# Patient Record
Sex: FEMALE | Race: WHITE | NOT HISPANIC OR LATINO
[De-identification: names, ages, dates, MRNs, and addresses within clinical notes are randomized per-mention and may not be internally consistent; named-entity substitution may affect disease eponyms.]

---

## 2018-04-19 ENCOUNTER — APPOINTMENT (OUTPATIENT)
Dept: CARDIOLOGY | Facility: CLINIC | Age: 58
End: 2018-04-19

## 2018-09-04 ENCOUNTER — APPOINTMENT (OUTPATIENT)
Dept: CARDIOLOGY | Facility: CLINIC | Age: 58
End: 2018-09-04
Payer: COMMERCIAL

## 2018-09-04 VITALS
HEART RATE: 93 BPM | WEIGHT: 176 LBS | RESPIRATION RATE: 18 BRPM | BODY MASS INDEX: 29.32 KG/M2 | HEIGHT: 65 IN | SYSTOLIC BLOOD PRESSURE: 100 MMHG | DIASTOLIC BLOOD PRESSURE: 65 MMHG

## 2018-09-04 DIAGNOSIS — Z00.00 ENCOUNTER FOR GENERAL ADULT MEDICAL EXAMINATION W/OUT ABNORMAL FINDINGS: ICD-10-CM

## 2018-09-04 DIAGNOSIS — Z78.9 OTHER SPECIFIED HEALTH STATUS: ICD-10-CM

## 2018-09-04 DIAGNOSIS — Z82.3 FAMILY HISTORY OF STROKE: ICD-10-CM

## 2018-09-04 DIAGNOSIS — Z86.010 PERSONAL HISTORY OF COLONIC POLYPS: ICD-10-CM

## 2018-09-04 PROCEDURE — 99244 OFF/OP CNSLTJ NEW/EST MOD 40: CPT

## 2018-09-04 PROCEDURE — 93000 ELECTROCARDIOGRAM COMPLETE: CPT

## 2018-09-28 ENCOUNTER — APPOINTMENT (OUTPATIENT)
Dept: CARDIOLOGY | Facility: CLINIC | Age: 58
End: 2018-09-28
Payer: COMMERCIAL

## 2018-09-28 PROCEDURE — 93015 CV STRESS TEST SUPVJ I&R: CPT

## 2018-10-15 ENCOUNTER — APPOINTMENT (OUTPATIENT)
Dept: CARDIOLOGY | Facility: CLINIC | Age: 58
End: 2018-10-15
Payer: COMMERCIAL

## 2018-10-15 PROCEDURE — 93306 TTE W/DOPPLER COMPLETE: CPT

## 2018-10-17 ENCOUNTER — APPOINTMENT (OUTPATIENT)
Dept: ULTRASOUND IMAGING | Facility: CLINIC | Age: 58
End: 2018-10-17
Payer: COMMERCIAL

## 2018-10-17 ENCOUNTER — OUTPATIENT (OUTPATIENT)
Dept: OUTPATIENT SERVICES | Facility: HOSPITAL | Age: 58
LOS: 1 days | End: 2018-10-17
Payer: COMMERCIAL

## 2018-10-17 DIAGNOSIS — I10 ESSENTIAL (PRIMARY) HYPERTENSION: ICD-10-CM

## 2018-10-17 DIAGNOSIS — E11.9 TYPE 2 DIABETES MELLITUS WITHOUT COMPLICATIONS: ICD-10-CM

## 2018-10-17 DIAGNOSIS — E78.5 HYPERLIPIDEMIA, UNSPECIFIED: ICD-10-CM

## 2018-10-17 PROCEDURE — 93880 EXTRACRANIAL BILAT STUDY: CPT | Mod: 26

## 2018-10-17 PROCEDURE — 93880 EXTRACRANIAL BILAT STUDY: CPT

## 2018-10-23 RX ORDER — OMEPRAZOLE 20 MG/1
20 TABLET, DELAYED RELEASE ORAL
Refills: 0 | Status: ACTIVE | COMMUNITY

## 2018-10-29 ENCOUNTER — APPOINTMENT (OUTPATIENT)
Dept: CARDIOLOGY | Facility: CLINIC | Age: 58
End: 2018-10-29
Payer: COMMERCIAL

## 2018-10-29 VITALS — SYSTOLIC BLOOD PRESSURE: 116 MMHG | DIASTOLIC BLOOD PRESSURE: 70 MMHG

## 2018-10-29 PROCEDURE — 99214 OFFICE O/P EST MOD 30 MIN: CPT

## 2018-10-29 PROCEDURE — 93000 ELECTROCARDIOGRAM COMPLETE: CPT

## 2019-04-29 ENCOUNTER — APPOINTMENT (OUTPATIENT)
Dept: CARDIOLOGY | Facility: CLINIC | Age: 59
End: 2019-04-29
Payer: COMMERCIAL

## 2019-04-29 ENCOUNTER — NON-APPOINTMENT (OUTPATIENT)
Age: 59
End: 2019-04-29

## 2019-04-29 VITALS
WEIGHT: 183 LBS | OXYGEN SATURATION: 98 % | HEART RATE: 116 BPM | HEIGHT: 65 IN | RESPIRATION RATE: 15 BRPM | BODY MASS INDEX: 30.49 KG/M2 | DIASTOLIC BLOOD PRESSURE: 50 MMHG | SYSTOLIC BLOOD PRESSURE: 108 MMHG

## 2019-04-29 PROCEDURE — 93000 ELECTROCARDIOGRAM COMPLETE: CPT

## 2019-04-29 PROCEDURE — 99214 OFFICE O/P EST MOD 30 MIN: CPT

## 2019-04-29 RX ORDER — FENOFIBRATE 150 MG/1
150 CAPSULE ORAL DAILY
Refills: 0 | Status: DISCONTINUED | COMMUNITY
End: 2019-04-29

## 2019-04-29 NOTE — ASSESSMENT
[FreeTextEntry1] : ECG sinus tach at 111 beats per minute no significant ST or T wave changes\par \par Laboratory data 4/18/19:\par Cholesterol 247\par HDL 45\par \par Triglycerides 231\par A1c 7.5\par \par  Echocardiogram 10/15/18:\par Normal LV size and function\par Mild aortic valve sclerosis\par No documented pulmonary hypertension.\par \par Carotid Doppler study 10/17/18:\par Mild plaquing of the carotids bilaterally. No hemodynamically significant stenosis.\par \par Exercise stress test 9/28/18:\par Exercise 8 minutes 30 seconds. (9 Mets).\par peak  (110% maximum).\par Blood pressure response normal\par ECG negative for arrhythmia or ischemia.\par Treadmill score 9 consistent with low risk.\par \par No laboratory data currently available.  \par \par Impression:  58-year-old female with early surgical menopause, diabetes mellitus, hyperlipidemia, a history of hypertension who now presents for clinic reevaluation of her potential for underlying cardiac disease.  \par She does experience some degree of exertional fatigue.  \par Stress testing shows reasonable reduced tolerance albeit with an accelerated heart rate response consistent with deconditioning. There is no evidence of ischemia.\par \par There is also the problem with being statin intolerant and has gone through at least three statins with musculoskeletal discomfort limiting its usage.\par The current lipid status in view of her known history of atherosclerotic disease of the carotids suggest that we have to try again. We'll start rosuvastatin 5 mg p.o. q.o.d. in conjunction with coincide Q10 200 mg a day. Hopefully she will tolerate this and will repeat blood work in 3 months.\par  A carotid Doppler does show some early plaque formation suggesting that her risk factors are actively contributing to the formation of atherosclerosis.\par \par She is also a bit concerned about her blood pressure being periodically low and she believes that she is occasionally symptomatic of this.  \par Does not appear to have any significant structural heart disease on echocardiography.\par \par Recommendations:\par 1.  Instructed the patient about the benefits of a diet that restricts portion sizes, increases frequency of meals and consists of  vegetables, (more green and leafy),fruit and nuts, whole grains, lean proteins and limits carbohydrates and meat and dairy fats\par \par 2.The patient was instructed to follow a symptom limited regimen of moderate aerobic exercise for 30 minutes 3 to 4 days a week. A warm up and cool down period are to be added to the regimen and small incremental changes can be made every few weeks. Any new symptoms of exercise related chest pain or dyspnea should be reported.\par \par 3. Obtain  blood work in 3 months\par \par 4. Consideration to weaning off antihypertensive meds over the course of the next several months\par  I think she needs to monitor her blood pressure carefully and establish whether or not there is adequate control and whether or not amlodipine could and should be stopped. ( she was told this last visit)\par \par We discussed the role of a more aggressive exercise regimen and her nutritional plan.    \par  \par

## 2019-04-29 NOTE — REASON FOR VISIT
[FreeTextEntry1] : This is a 58-year-old female who presents here for cardiac re-evaluation with concerns about her underlying risk for cardiac disease.  \par \par She does have a history that includes:\par 1.  Diabetes.\par 2.  Hyperlipidemia. \par 3.  An equivocal family history of premature coronary disease.  \par 4. HTN\par \par Her blood pressure has been controlled since she began taking losartan and amlodipine. \par She is not regularly exercising and dietary measures have been inconsistent\par She denies any actual chest pain, palpitations, PND, orthopnea, or edema.  No syncope or near syncope. She denies hypertension or tobacco use.   \par Complains of periodic lightheadedness and heart racing.\par Other medical history is as detailed below.\par \par Patient did have surgical menopause 8 years ago.  \par

## 2019-08-12 ENCOUNTER — APPOINTMENT (OUTPATIENT)
Dept: CARDIOLOGY | Facility: CLINIC | Age: 59
End: 2019-08-12

## 2020-04-23 RX ORDER — DOCUSATE SODIUM 100 MG/1
CAPSULE ORAL
Refills: 0 | Status: DISCONTINUED | COMMUNITY
End: 2020-04-23

## 2020-04-23 RX ORDER — NAPROXEN SODIUM 220 MG
TABLET ORAL
Refills: 0 | Status: DISCONTINUED | COMMUNITY
End: 2020-04-23

## 2020-04-23 RX ORDER — PSYLLIUM HUSK 0.4 G
CAPSULE ORAL
Refills: 0 | Status: DISCONTINUED | COMMUNITY
End: 2020-04-23

## 2020-04-27 ENCOUNTER — APPOINTMENT (OUTPATIENT)
Dept: CARDIOLOGY | Facility: CLINIC | Age: 60
End: 2020-04-27
Payer: COMMERCIAL

## 2020-04-27 PROCEDURE — 99214 OFFICE O/P EST MOD 30 MIN: CPT | Mod: 95

## 2020-04-27 RX ORDER — LOSARTAN POTASSIUM 100 MG/1
100 TABLET, FILM COATED ORAL DAILY
Qty: 90 | Refills: 3 | Status: ACTIVE | COMMUNITY
Start: 1900-01-01 | End: 1900-01-01

## 2020-04-27 RX ORDER — ROSUVASTATIN CALCIUM 5 MG/1
5 TABLET, FILM COATED ORAL
Qty: 90 | Refills: 3 | Status: ACTIVE | COMMUNITY
Start: 2019-04-29 | End: 1900-01-01

## 2020-04-27 RX ORDER — UBIDECARENONE/VIT E ACET 100MG-5
100 CAPSULE ORAL
Refills: 0 | Status: ACTIVE | COMMUNITY

## 2020-04-27 NOTE — PHYSICAL EXAM
[General Appearance - Well Developed] : well developed [Normal Appearance] : normal appearance [General Appearance - Well Nourished] : well nourished [Normal Conjunctiva] : the conjunctiva exhibited no abnormalities [Respiration, Rhythm And Depth] : normal respiratory rhythm and effort [Normal Jugular Venous V Waves Present] : normal jugular venous V waves present [Exaggerated Use Of Accessory Muscles For Inspiration] : no accessory muscle use [FreeTextEntry1] : No edema [Skin Color & Pigmentation] : normal skin color and pigmentation [Skin Turgor] : normal skin turgor [] : no rash [Impaired Insight] : insight and judgment were intact [Oriented To Time, Place, And Person] : oriented to person, place, and time [No Anxiety] : not feeling anxious

## 2020-04-27 NOTE — HISTORY OF PRESENT ILLNESS
[Home] : at home, [unfilled] , at the time of the visit. [Medical Office: ([facility])___] : at the medical office located in  [Patient] : the patient [Self] : self [FreeTextEntry1] : Patient denies any other significant complaints.\par She is isolating in her children's house and has kept free of any contact with people sick with Carona\par \par No cough, shortness of breath or fever.

## 2020-04-27 NOTE — REASON FOR VISIT
[FreeTextEntry1] : This is a 58-year-old female who presents  for cardiac re-evaluation with concerns about her underlying risk for cardiac disease.  \par \par She does have a history that includes:\par 1.  Diabetes.\par 2.  Hyperlipidemia. \par 3.  An equivocal family history of premature coronary disease.  \par 4. HTN\par \par Her blood pressure has been controlled since she began taking losartan and amlodipine.   Avg = 120/70 mm hg\par She is walking regularly exercising, but dietary measures have been inconsistent\par She denies any actual chest pain, palpitations, PND, orthopnea, or edema.  No syncope or near syncope. She denies tobacco use.   \par Complains of periodic lightheadedness and heart racing.\par Other medical history is as detailed below.\par \par Patient did have surgical menopause 8 years ago.  \par

## 2020-04-27 NOTE — ASSESSMENT
[FreeTextEntry1] : \par \par Lab Data \par \par      2020\par Chol:     147         247\par HDL:      43             45\par LDL:       75          156\par Tr          231\par A1C =   9.7           7.5\par \par Laboratory data 19:\par Cholesterol 247\par HDL 45\par \par Triglycerides 231\par A1c 7.5\par \par  Echocardiogram 10/15/18:\par Normal LV size and function\par Mild aortic valve sclerosis\par No documented pulmonary hypertension.\par \par Carotid Doppler study 10/17/18:\par Mild plaquing of the carotids bilaterally. No hemodynamically significant stenosis.\par \par Exercise stress test 18:\par Exercise 8 minutes 30 seconds. (9 Mets).\par peak  (110% maximum).\par Blood pressure response normal\par ECG negative for arrhythmia or ischemia.\par Treadmill score 9 consistent with low risk.\par \par No laboratory data currently available.  \par \par Impression:  58-year-old female with early surgical menopause, diabetes mellitus, hyperlipidemia, a history of hypertension who now presents for clinic reevaluation of her potential for underlying cardiac disease.  \par She does experience some degree of exertional fatigue.  \par Stress testing shows reasonable reduced tolerance albeit with an accelerated heart rate response consistent with deconditioning. There is no evidence of ischemia.\par \par She has been statin intolerant to Atorvastatin and simvastatin with musculoskeletal discomfort limiting its usage, but now tolerating low dose rosuvastatin with good results.\par \par  We'll continue rosuvastatin 5 mg p.o. q.o.d. in conjunction with  Co- Q10 200 mg a day and will repeat blood work in 3 months.\par  A carotid Doppler does show some early plaque formation suggesting that her risk factors are actively contributing to the formation of atherosclerosis.\par \par Her blood pressure being periodically low and she believes that she is occasionally symptomatic of this.  \par Does not appear to have any significant structural heart disease on echocardiography.\par \par Recommendations:\par 1.  Instructed the patient about the benefits of a diet that restricts portion sizes, increases frequency of meals and consists of  vegetables, (more green and leafy),fruit and nuts, whole grains, lean proteins and limits carbohydrates and meat and dairy fats\par    intermittent fasting suggested\par \par 2.The patient was instructed to follow a symptom limited regimen of moderate aerobic exercise for 30 minutes 3 to 4 days a week. A warm up and cool down period are to be added to the regimen and small incremental changes can be made every few weeks. Any new symptoms of exercise related chest pain or dyspnea should be reported.\par \par 3. Obtain  blood work in 3 months. If the A1C remains this elevated may need to see Endocrine\par \par We discussed the role of a more aggressive exercise regimen and her nutritional plan.    \par  \par

## 2021-06-23 ENCOUNTER — RX RENEWAL (OUTPATIENT)
Age: 61
End: 2021-06-23

## 2021-06-23 ENCOUNTER — APPOINTMENT (OUTPATIENT)
Dept: CARDIOLOGY | Facility: CLINIC | Age: 61
End: 2021-06-23
Payer: COMMERCIAL

## 2021-06-23 VITALS
WEIGHT: 174 LBS | HEIGHT: 65 IN | TEMPERATURE: 97.6 F | DIASTOLIC BLOOD PRESSURE: 68 MMHG | RESPIRATION RATE: 16 BRPM | OXYGEN SATURATION: 98 % | SYSTOLIC BLOOD PRESSURE: 98 MMHG | BODY MASS INDEX: 28.99 KG/M2 | HEART RATE: 97 BPM

## 2021-06-23 DIAGNOSIS — E78.5 HYPERLIPIDEMIA, UNSPECIFIED: ICD-10-CM

## 2021-06-23 DIAGNOSIS — I10 ESSENTIAL (PRIMARY) HYPERTENSION: ICD-10-CM

## 2021-06-23 DIAGNOSIS — I65.23 OCCLUSION AND STENOSIS OF BILATERAL CAROTID ARTERIES: ICD-10-CM

## 2021-06-23 DIAGNOSIS — E11.9 TYPE 2 DIABETES MELLITUS W/OUT COMPLICATIONS: ICD-10-CM

## 2021-06-23 PROCEDURE — 99072 ADDL SUPL MATRL&STAF TM PHE: CPT

## 2021-06-23 PROCEDURE — 93000 ELECTROCARDIOGRAM COMPLETE: CPT

## 2021-06-23 PROCEDURE — 99214 OFFICE O/P EST MOD 30 MIN: CPT

## 2021-06-23 RX ORDER — METFORMIN HYDROCHLORIDE 1000 MG/1
1000 TABLET, COATED ORAL DAILY
Qty: 90 | Refills: 3 | Status: ACTIVE | COMMUNITY
Start: 2021-06-23 | End: 1900-01-01

## 2021-06-23 RX ORDER — AMLODIPINE BESYLATE 5 MG/1
5 TABLET ORAL DAILY
Qty: 90 | Refills: 1 | Status: ACTIVE | COMMUNITY
Start: 2021-06-23 | End: 1900-01-01

## 2021-06-23 NOTE — PHYSICAL EXAM
[General Appearance - Well Developed] : well developed [Normal Appearance] : normal appearance [General Appearance - Well Nourished] : well nourished [Normal Conjunctiva] : the conjunctiva exhibited no abnormalities [Normal Jugular Venous V Waves Present] : normal jugular venous V waves present [Respiration, Rhythm And Depth] : normal respiratory rhythm and effort [Exaggerated Use Of Accessory Muscles For Inspiration] : no accessory muscle use [Skin Color & Pigmentation] : normal skin color and pigmentation [Skin Turgor] : normal skin turgor [] : no rash [Oriented To Time, Place, And Person] : oriented to person, place, and time [No Anxiety] : not feeling anxious [Impaired Insight] : insight and judgment were intact [FreeTextEntry1] : No edema

## 2021-06-23 NOTE — ASSESSMENT
[FreeTextEntry1] : ECG: Sinus rhythm at 97 bpm.  Left atrial enlargement.  No significant CTB changes.\par \par Lab Data \par \par      2020\par Chol:     147         247          189\par HDL:      43             45           38\par LDL:       75          156           79\par Tr          231         449\par A1C =   9.7           7.5           11.2\par \par Laboratory data 19:\par Cholesterol 247\par HDL 45\par \par Triglycerides 231\par A1c 7.5\par \par  Echocardiogram 10/15/18:\par Normal LV size and function\par Mild aortic valve sclerosis\par No documented pulmonary hypertension.\par \par Carotid Doppler study 10/17/18:\par Mild plaquing of the carotids bilaterally. No hemodynamically significant stenosis.\par \par Exercise stress test 18:\par Exercise 8 minutes 30 seconds. (9 Mets).\par peak  (110% maximum).\par Blood pressure response normal\par ECG negative for arrhythmia or ischemia.\par Treadmill score 9 consistent with low risk.\par \par No laboratory data currently available.  \par \par Impression:  60 -year-old female with early surgical menopause, uncontrolled diabetes mellitus, mixed hyperlipidemia, a history of hypertension who now presents for clinic reevaluation of her potential for underlying cardiac disease.  \par   \par Blood pressure possibly overcorrected causing her to be fatigued and dizzy at times.\par \par Prior stress testing showed reasonable reduced tolerance albeit with an accelerated heart rate response consistent with deconditioning. There is no evidence of ischemia.  There have been no new intervening cardiac symptoms\par \par She has been statin intolerant to Atorvastatin and simvastatin with musculoskeletal discomfort limiting its usage, but now tolerating low dose rosuvastatin with good results.\par She has had similar myalgias due to the use of fenofibrate.\par \par Patient admits to considerable dietary indiscretion in the course of this past year, which she believes accounts for the substantial increase in triglycerides and A1c.\par \par Plan:\par 1.  Patient agrees to begin a very strict carbohydrate sugar and fat restricted diet.  We discussed this in some great detail.  We'll continue rosuvastatin 5 mg p.o. q.o.d. in conjunction with  Co- Q10 200 mg a day and will repeat blood work in 3 months.\par \par 2. A carotid Doppler 2018 showed some early plaque formation suggesting that her risk factors are actively contributing to the formation of atherosclerosis.\par \par 3. Her blood pressure being periodically low and she believes that she is occasionally symptomatic of this.  \par \par 4.Does not appear to have any significant structural heart disease on echocardiography.\par \par Recommendations:\par 1.  Instructed the patient about the benefits of a diet that restricts portion sizes, increases frequency of meals and consists of  vegetables, (more green and leafy),fruit and nuts, whole grains, lean proteins and limits carbohydrates and meat and dairy fats\par    intermittent fasting suggested\par \par 2.The patient was instructed to follow a symptom limited regimen of moderate aerobic exercise for 30 minutes 3 to 4 days a week. A warm up and cool down period are to be added to the regimen and small incremental changes can be made every few weeks. Any new symptoms of exercise related chest pain or dyspnea should be reported.  \par Encouraged patient to incorporate some form of resistance exercise into this regimen, as well.\par \par 3. Obtain  blood work in 3 months. If the A1C remains this elevated may need to see Endocrine\par \par 4.  Patient will discontinue amlodipine reassess blood pressures and reevaluate symptoms of fatigue and periodic lightheadedness.\par \par 5.  Calcium scoring may be useful to help with stratify and determine how aggressive we should be with risk factor modification.  \par  \par

## 2021-06-23 NOTE — HISTORY OF PRESENT ILLNESS
[FreeTextEntry1] : Patient denies any other significant complaints.\par \par Patient lost her job about a year ago and has been somewhat anxious and suffering from some degree of insomnia ever since.\par \par No cough, shortness of breath or fever.

## 2021-06-23 NOTE — REASON FOR VISIT
[FreeTextEntry1] : This is a 60 -year-old female who presents  for cardiac re-evaluation with concerns about her underlying risks for cardiac disease.  \par Prompting this is some recent blood work showing a marked increase in triglycerides and A1c.\par \par She does have a history that includes:\par 1.  Diabetes.\par 2.  Hyperlipidemia. \par 3.  An equivocal family history of premature coronary disease.  \par 4. HTN\par \par Her blood pressure has been controlled since she began taking losartan and amlodipine.  \par BP is frequently low and at times patient feels fatigued and a bit lightheaded.\par \par Rides a bike and walks her grandchildren a couple of times a week but no rigorous or regular exercise program.\par \par Admits that her diet has been very inconsistent with large amounts of sugars and carbohydrates.\par \par She denies any actual chest pain, palpitations, PND, orthopnea, or edema.  No syncope or near syncope. She denies tobacco use.   \par \par Other medical history is as detailed below.\par \par Patient did have surgical menopause nearly 15 years ago\par

## 2021-12-16 ENCOUNTER — APPOINTMENT (OUTPATIENT)
Dept: CARDIOLOGY | Facility: CLINIC | Age: 61
End: 2021-12-16

## 2023-04-14 NOTE — PHYSICAL EXAM
[FreeTextEntry1] :                    Well appearing and nourished with no obvious deformities or distress.\par \par Eyes: \par No conjunctival injection and no xanthelasmas.\par HEENT: \par Normocephalic.Normal oral mucosa. No pallor or cyanosis\par Neck: \par No jugular venous distension. with normal A and V wave forms. No palpable adenopathy.\par Cardiovascular: \par rapid rate with regular  rhythm with normal S1, S2 and a grade 1/6 systolic murmur. Distal arterial pulses are normal. No significant peripheral edema.\par Pulmonary: \par Lungs are clear to auscultation and percussion. Normal respiratory pattern without any accessory muscle use\par Abdomen: \par Soft, non-tender ; no palpable organomegaly or masses.\par Extremities:\par No digital clubbing, cyanosis or ischemic changes.\par Skin: \par No skin lesions, rashes, ulcers or xanthomas.\par Psychiatric: \par Alert and oriented to person, place and time. Appropriate mood and affect. Secondary Intention Text (Leave Blank If You Do Not Want): The defect will heal with secondary intention.